# Patient Record
Sex: MALE | Race: ASIAN
[De-identification: names, ages, dates, MRNs, and addresses within clinical notes are randomized per-mention and may not be internally consistent; named-entity substitution may affect disease eponyms.]

---

## 2017-10-07 ENCOUNTER — HOSPITAL ENCOUNTER (EMERGENCY)
Dept: HOSPITAL 45 - C.EDB | Age: 22
Discharge: HOME | End: 2017-10-07
Payer: COMMERCIAL

## 2017-10-07 VITALS
WEIGHT: 126.99 LBS | BODY MASS INDEX: 20.41 KG/M2 | HEIGHT: 65.98 IN | WEIGHT: 126.99 LBS | HEIGHT: 65.98 IN | BODY MASS INDEX: 20.41 KG/M2

## 2017-10-07 VITALS — OXYGEN SATURATION: 97 % | DIASTOLIC BLOOD PRESSURE: 73 MMHG | SYSTOLIC BLOOD PRESSURE: 131 MMHG | HEART RATE: 74 BPM

## 2017-10-07 VITALS — TEMPERATURE: 98.78 F

## 2017-10-07 DIAGNOSIS — S62.637A: Primary | ICD-10-CM
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
## 2017-10-07 DIAGNOSIS — S62.625A: ICD-10-CM

## 2017-10-07 NOTE — DIAGNOSTIC IMAGING REPORT
L HAND MIN 3 VIEWS ROUTINE



HISTORY:  22 years-old Male L hand (4th and 5th finger) pain acute left fifth

digit pain status post trauma



COMPARISON: None available



TECHNIQUE: 3 views of the left hand



FINDINGS: 

Acute oblique intra-articular fracture involves the dorsal medial aspect of the

fifth distal phalanx without significant displacement. Acute intra-articular

oblique fracture involves the fourth middle phalanx extending into the DIP joint

demonstrating mild volar displacement of 2 mm. Moderate soft tissue swelling of

the fourth and fifth digits.



IMPRESSION: 

1. Acute mildly displaced intra-articular fracture of the fourth middle phalanx.

2. Acute intra-articular fracture of the dorsal medial aspect of the fifth

distal phalanx.







The above report was generated using voice recognition software. It may contain

grammatical, syntax or spelling errors.







Electronically signed by:  Hank Khan M.D.

10/7/2017 9:39 PM



Dictated Date/Time:  10/7/2017 9:36 PM

## 2017-10-07 NOTE — EMERGENCY ROOM VISIT NOTE
History


First contact with patient:  21:12


Chief Complaint:  FINGER PAIN


Stated Complaint:  BROKEN FINGERS, LEFT HAND





History of Present Illness


The patient is a 22 year old male who presents to the Emergency Room with 

complaints of injuries to his left fourth and fifth fingers while attempting to 

pop a balloon.  The patient reports that it feels like his fingertips are 

dislocated.  He did report a deformity of the fourth finger and pulled it to 

make it look straighter.  He denies any pain extending into the hand or wrist.  

The patient is left-hand dominant, and rates his discomfort a 5 out of 10 on my 

exam.  The patient has not taken any medicine for pain.





Review of Systems


10 system review was performed and was negative except for pertinent positives 

and negatives as indicated in history of present illness





Past Medical/Surgical History


Medical Problems:


(1) No significant past medical history


Surgical Problems:


(1) No history of previous surgery








Family History





FH: cancer





Social History


Smoking Status:  Never Smoker


Alcohol Use:  none


Marital Status:  single


Housing Status:  lives with friends


Occupation Status:  [employer] student





Current/Historical Medications


No Active Prescriptions or Reported Meds





Physical Exam


Vital Signs











  Date Time  Temp Pulse Resp B/P (MAP) Pulse Ox O2 Delivery O2 Flow Rate FiO2


 


10/7/17 22:09  74 18 131/73 97   


 


10/7/17 21:03 37.1 82 18 112/74 98 Room Air  











Physical Exam


CONSTITUTIONAL:  Healthy and well nourished.  Alert and oriented X 3 with 

positive affect.  Patient does not appear in any acute distress or discomfort.


HEENT:  Normocephalic, atraumatic.  Pupils equal, round and reactive.  


NECK:  Full active range of motion without discomfort.


MUSCULOSKELETAL: Examination shows edema of the distal fourth and fifth 

fingers.  No open wounds or nail plate injuries noted.  Capillary refill is 

less than 2 seconds.


INTEGUMENTARY:  No rash or other significant dermatologic conditions noted.


NEUROLOGIC:  No focal neurologic deficits noted.  Left hand and fingers are 

sensory intact.





Medical Decision & Procedures


ER Provider


Diagnostic Interpretation:


My interpretation of left hand x-rays shows an intra-articular fracture 

extending from the middle phalanx of the ring finger into the DIP joint, and a 

dorsal avulsion fracture at the base of the distal phalanx of the fifth finger.

  No dislocations noted.  Radiologist report is as follows:





L HAND MIN 3 VIEWS ROUTINE





HISTORY:  22 years-old Male L hand (4th and 5th finger) pain acute left fifth


digit pain status post trauma





COMPARISON: None available





TECHNIQUE: 3 views of the left hand





FINDINGS: 


Acute oblique intra-articular fracture involves the dorsal medial aspect of the


fifth distal phalanx without significant displacement. Acute intra-articular


oblique fracture involves the fourth middle phalanx extending into the DIP joint


demonstrating mild volar displacement of 2 mm. Moderate soft tissue swelling of


the fourth and fifth digits.





IMPRESSION: 


1. Acute mildly displaced intra-articular fracture of the fourth middle phalanx.


2. Acute intra-articular fracture of the dorsal medial aspect of the fifth


distal phalanx.





ED Course


Patient history and physical exam were performed.  Nurse's notes were reviewed.

  The patient refused any analgesics.  An ice pack was applied.  X-rays of the 

left hand shows fractures as discussed in the previous Diagnostic 

Interpretation section.  A metal splint and qian taping were applied to the 

left fourth and fifth fingers.  The patient was provided contact information 

for [facility] for further follow-up.  The patient was encouraged 

to intermittently apply ice and elevate the hand for swelling.  Ibuprofen and 

Tylenol in alternating fashion if needed for additional pain relief.  The 

patient was happy with plan of care, voiced understanding of all discharge 

instructions, and denied any significant pain at the conclusion of my exam.





Medical Decision








Medication Reconcilliation


Current Medication List:  was personally reviewed by me





Blood Pressure Screening


Patient's blood pressure:  Normal blood pressure





Impression





 Primary Impression:  


 Closed fracture of phalanx of left ring finger


 Additional Impression:  


 Fracture of distal phalanx of left little finger





Departure Information


Dispostion


Home / Self-Care





Prescriptions





No Active Prescriptions or Reported Meds





Referrals


Tapan Silva MD





Forms


HOME CARE DOCUMENTATION FORM,                                                 

               IMPORTANT VISIT INFORMATION





Patient Instructions


My [facility]





Additional Instructions





Ice and elevate hand for swelling and pain.  


Keep metal splint and qian taping in place.


Ibuprofen 800 mg and/or Tylenol 1000 mg every 8 hours.


You may also alternate these medications for more effective pain relief:


Ibuprofen --4 HRS--> Tylenol --4 HRS--> ibuprofen --4 HRS--> Tylenol ....


Follow-up with [facility] (Dr. Silva) for further evaluation and 

treatment - call Mon AM for appointment.





Problem Qualifiers








 Primary Impression:  


 Closed fracture of phalanx of left ring finger


 Encounter type:  initial encounter  Phalanx:  middle  Fracture alignment:  

displaced  Qualified Codes:  S62.625A - Displaced fracture of middle phalanx of 

left ring finger, initial encounter for closed fracture


 Additional Impression:  


 Fracture of distal phalanx of left little finger


 Encounter type:  initial encounter  Fracture type:  closed  Fracture alignment

:  displaced  Qualified Codes:  S62.637A - Displaced fracture of distal phalanx 

of left little finger, initial encounter for closed fracture

## 2017-10-13 ENCOUNTER — HOSPITAL ENCOUNTER (OUTPATIENT)
Dept: HOSPITAL 45 - X.SURG | Age: 22
Discharge: HOME | End: 2017-10-13
Attending: ORTHOPAEDIC SURGERY
Payer: COMMERCIAL

## 2017-10-13 VITALS
BODY MASS INDEX: 20.94 KG/M2 | HEIGHT: 65 IN | BODY MASS INDEX: 20.94 KG/M2 | WEIGHT: 125.66 LBS | HEIGHT: 65 IN | WEIGHT: 125.66 LBS

## 2017-10-13 VITALS — DIASTOLIC BLOOD PRESSURE: 83 MMHG | HEART RATE: 50 BPM | SYSTOLIC BLOOD PRESSURE: 124 MMHG | OXYGEN SATURATION: 100 %

## 2017-10-13 VITALS — TEMPERATURE: 97.16 F

## 2017-10-13 DIAGNOSIS — S62.625A: ICD-10-CM
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
## 2017-10-13 DIAGNOSIS — S62.667A: Primary | ICD-10-CM

## 2017-10-13 NOTE — ANESTHESIA PROGRESS NT - [facility]
Anesthesia Post Op Note


Date & Time


Oct 13, 2017 at 09:00





Vital Signs


Pain Intensity:  0





Vital Signs Past 12 Hours








  Date Time  Temp Pulse Resp B/P (MAP) Pulse Ox O2 Delivery O2 Flow Rate FiO2


 


10/13/17 08:19 36.2 61 20 117/54 100 Mask 6 


 


10/13/17 06:27 36.7 60 16 97/73 (81) 97 Room Air  











Notes


Mental Status:  alert / awake / arousable, participated in evaluation


Pt Amnestic to Procedure:  Yes


Nausea / Vomiting:  adequately controlled


Pain:  adequately controlled


Airway Patency, RR, SpO2:  stable & adequate


BP & HR:  stable & adequate


Hydration State:  stable & adequate


Anesthetic Complications:  no major complications apparent

## 2017-10-13 NOTE — [facility] OPERATIVE REPORT
Operative Report


Operative Date


Oct 13, 2017.





Pre-Operative Diagnosis





Left Ring And Little Fingers Fractures





Post-Operative Diagnosis





Same





Procedure(s) Performed





Left Ring And Little Fingers Closed Reduction Percutaneous Pinning





Surgeon


Dr Dykes





Assistant Surgeon(s)


JAIME Solano PA-C





Estimated Blood Loss


2ml





Findings


Closed mallet fracture left hand little finger.  Intra-articular fracture 

middle phalanx distal aspect left ring finger





Specimens





None





Drains


none





Anesthesia


LMA





Complication(s)


None





Disposition


Recovery Room / PACU





Implants


0.035 inch K wires





Indications


Patient's a 22-year-old male who possible limited sustained the after mentioned 

injuries.  The little finger injury would be amenable to nonsurgical treatment.

  I think it's reasonable to go ahead and pin this since he has a articular 

fracture at the PIP joint of the middle phalanx with rotational upper malady 

and displacement.





Description of Procedure


Informed consent was obtained.  The patient identified.  He identified 

operative site as the left ring and little fingers.  I marked them with my 

initials.  A preoperative surgical timeout was performed.  A preoperative dose 

of IV antibiotics was given.  He was taken to the operating room positioned 

supine on the operating room table and a laryngeal mask anesthetic was 

administered.  A tourniquet was applied to the left arm but not inflated.  The 

left arm supported on a hand table.  The left upper extremity was prepped and 

draped in the usual sterile fashion.  A ring finger initially showed internal 

rotation.  With distraction and derotation anatomic alignment was obtained.  

Comparison was made preoperatively to the other fingers both flexed and 

extended and to the contralateral hand to obtain orientation of the nailbeds.  

Initially the nail bed of the ring finger was not parallel.  EVT prophylaxis is 

not indicated.  For scopic guidance was utilized for the case.  1% lidocaine 

with epinephrine was injected for digital block at the conclusion of the 

operation total volume 6 mL.





Extension block pinning was performed on the little finger.  A 0.035 inch K 

wire was introduced from the tip of the finger is still phalanx proximally.  

This was advanced part way's into the distal phalanx.  Or scopic guidance was 

utilized to insert a pin just proximal to the dorsal fracture fragment.  The 

finger was then flexed and this was utilized to pull the fragment distally.  

The dorsal pin was then driven into the metacarpal head percutaneously area the 

finger was extended but not completely and the distal pin was driven across the 

DIP joint.  The joint was not subluxated.  The fracture was anatomically 

aligned and the pin was were in good position both the AP and lateral planes.  

Representative fluoroscopic images were obtained.  I elected to proceed with 

this procedure additionally because the fracture did show slight displacement 

on radiographs.





Attention was then turned to the other of finger.  That is the ring finger.  

The other fat fingers were wrapped in Covan.  A towel clamp was placed 

percutaneously through the tuft of the distal phalanx.  Longitudinal traction 

was applied and the fracture was manipulated with manual traction and 

compression.  A bone clamp was applied across the joint compressing it.  This 

resulted in an anatomic reduction.  2.035 inch K wires were then inserted 

across the articular fracture holding it in anatomic alignment.  The pins were 

bicortical completely within the bone on both views.  Pins were adjusted as 

necessary.  The fracture had slight comminution proximally.  The ulnar condyle 

was fractured off the fracture fragment was relatively small and this was 

shortened and rotated volarly.  Anatomic alignment was obtained.  The pins were 

in good position.





Jurgan balls were applied and the pins were cut short outside the skin.  

Xeroform 4 x 4's and cast padding and a splint were applied.  An aluminum foam 

1 inch show splint holding the fingers in extension.  Padding was placed 

between the fingers to protect the fingers in the pins.





Patient was then awakened from anesthesia without difficulty taken recovery 

room in stable condition.  There were no specimens or complications.  Counts 

are correct in the case.  Blood loss was minimal.  At the conclusion of the 

operation I spoke the patient's friend and detailed postoperative instructions 

were given.  He'll return in several days for wound check and dressing change.  

Pins will remain in place for 3-4 weeks.


I attest to the content of the Intraoperative Record and any orders documented 

therein.  Any exceptions are noted below.

## 2017-10-13 NOTE — [facility] OPERATIVE REPORT
Operative Report


Operative Date


Oct 13, 2017.





Pre-Operative Diagnosis





Left Ring And Little Fingers Fractures





Post-Operative Diagnosis





Same





Procedure(s) Performed





Left Ring And Little Fingers Closed Reduction Percutaneous Pinning





Surgeon


Dr Dykes





Assistant Surgeon(s)


Mireya Solano PA-C





Estimated Blood Loss


2ml





Findings


Proximal phalanx fracture left ring finger, bony mallet fracture of his little 

finger





Specimens





None





Drains


none





Anesthesia


Gen.





Complication(s)


None





Disposition


Recovery Room / PACU (stable)





Indications


Patient is a 22-year-old male who injured his left fourth and fifth fingers 6 

days ago while trying to pop a balloon.  He immediate pain, went to the 

emergency room.  X-rays were taken and he was found to have a left displaced 

fracture of his fourth proximal phalanx and a bony mallet fracture of his left 

small finger.  It was placed in a splint and advised to follow-up with 

orthopedics.  He was seen and evaluated by Dr. Dykes.  Surgical 

intervention was recommended for improvement of fracture alignment and 

stabilization.  He agreed to proceed with surgery.  Risks and complications 

were discussed.  Informed consent was obtained.





Description of Procedure


Patient was taken to the operating room and placed under general anesthesia.  

He was given 1 g of IV Ancef for surgical prophylaxis.  Timeout was performed.  

This prepped and draped in routine sterile fashion.  I was present during the 

entire case, please see Dr. Dykes's operative report for further detail.  

He was awakened and transferred to the recovery room in stable condition.


I attest to the content of the Intraoperative Record and any orders documented 

therein.  Any exceptions are noted below.

## 2017-10-13 NOTE — DISCHARGE INSTRUCTIONS-[facility]
Discharge Instructions


Date of Service


Oct 13, 2017.





Visit


Reason for Visit:  Left Ring And Little Finger Fractures





Discharge


Discharge Diagnosis / Problem:  left ring and little finger fractures





Discharge Goals


Goal(s):  Decrease discomfort, Improve function, Increase independence





Activity Recommendations


Activity Limitations:  per Instructions/Follow-up section


Weightbearing Status:  Left non-weightbearing (left hand)





Anesthesia


.





Post Anesthesia Instructions:





If you have had General Anesthesia or IV Sedation:





*  Do not drive today.


*  Resume driving when surgeon permits.


*  Do not make important decisions or sign legal documents today.


*  Call surgeon for:





   1.  Temperature elevations greater than 101 degrees F.


   2.  Uncontrollable pain.


   3.  Excessive bleeding.


   4.  Persistent nausea and vomiting.


   5.  Medication intolerance (nausea, vomiting or rash).





*  For nausea and vomiting use only clear liquids such as: tea, soda, bouillon 

until nausea subsides, then gradually increase diet as tolerated.





*  If you have any concerns or questions, call your surgeon's office.  If 

physician is unavailable and it is an emergency, call 911 or go to the nearest 

emergency room.





.





Instructions / Follow-Up


Instructions / Follow-Up





DIET:





*  Resume previous diet.








MEDICATIONS:





*  Please take your prescriptions as instructed at your pre-op appointment and/

or see


   medication discharge instructions listed above.





*  If concerns develop, call your physician's office at [phone].








SPECIAL CARE INSTRUCTIONS:





*  Ice to left hand as needed for pain/swelling





*  Elevate left hand above heart to relieve pain/swelling.





*  Keep dressing clean, dry, intact. Keep splint on at all times.  Able to move/

wiggle other fingers as tolerated.





*  No use of left hand.  No pushing, pulling, lifting left hand.  Okay to use 

to write as needed or tolerated.





*  Your surgical extremity may be discolored due to prepping agents used on the 

skin.  


   A bluish-green tint is a normal variant and should not cause alarm.





Call your doctor at [phone] if:





*  Temperature above 101 degrees





*  Pain not relieved by pain medicine ordered





*  There is increased drainage or redness from any incision





*  You have any unanswered questions, problems or concerns.








FOLLOW UP VISIT:





*  If not already scheduled, please call the office at [phone] to 

schedule a follow-up


   appointment.





*  You have a follow up appointment with [facility] on 10/17/17 at 

8:00 a.m.





*  You have a follow up appointment with Dr. Dykes on 10/23/17 at 4:00 p.m.





Diet Recommendations


Home Diet:  no limitations, resume previous diet





Procedures


Procedures Performed:  


Left Ring And Little Fingers Closed Reduction Percutaneous Pinning





Pending Studies


Studies pending at discharge:  no





Medical Emergencies








.


Who to Call and When:





Medical Emergencies:  If at any time you feel your situation is an emergency, 

please call 911 immediately.





.





Non-Emergent Contact


Non-Emergency issues call your:  Surgeon


Call Non-Emergent contact if:  temperature is above 101, your pain is not 

controlled, your pain is worsening, wound has increased drainage, wound has 

increased redness, wound has increased pain, you have any medication questions





.


.








"Provider Documentation" section prepared by Mireya Solano.








.





[place] Drug Monitoring Program


Search Results:  patient reviewed within database, no issues identified

## 2017-11-08 ENCOUNTER — HOSPITAL ENCOUNTER (OUTPATIENT)
Dept: HOSPITAL 45 - C.RDSM | Age: 22
Discharge: HOME | End: 2017-11-08
Attending: ORTHOPAEDIC SURGERY
Payer: COMMERCIAL

## 2017-11-08 DIAGNOSIS — S62.625D: Primary | ICD-10-CM

## 2017-11-08 DIAGNOSIS — S62.667D: ICD-10-CM
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
## 2017-11-29 ENCOUNTER — HOSPITAL ENCOUNTER (OUTPATIENT)
Dept: HOSPITAL 45 - C.RDSM | Age: 22
Discharge: HOME | End: 2017-11-29
Attending: ORTHOPAEDIC SURGERY
Payer: COMMERCIAL

## 2017-11-29 DIAGNOSIS — S62.667D: ICD-10-CM

## 2017-11-29 DIAGNOSIS — S62.625D: Primary | ICD-10-CM
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
## 2018-01-17 ENCOUNTER — HOSPITAL ENCOUNTER (OUTPATIENT)
Dept: HOSPITAL 45 - C.RDSM | Age: 23
Discharge: HOME | End: 2018-01-17
Attending: ORTHOPAEDIC SURGERY
Payer: COMMERCIAL

## 2018-01-17 DIAGNOSIS — S62.667D: ICD-10-CM

## 2018-01-17 DIAGNOSIS — S62.625D: Primary | ICD-10-CM
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
